# Patient Record
Sex: FEMALE | Race: ASIAN | NOT HISPANIC OR LATINO | Employment: UNEMPLOYED | URBAN - METROPOLITAN AREA
[De-identification: names, ages, dates, MRNs, and addresses within clinical notes are randomized per-mention and may not be internally consistent; named-entity substitution may affect disease eponyms.]

---
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
## 2022-07-24 ENCOUNTER — HEALTH MAINTENANCE LETTER (OUTPATIENT)
Age: 22
End: 2022-07-24

## 2022-10-02 ENCOUNTER — HEALTH MAINTENANCE LETTER (OUTPATIENT)
Age: 22
End: 2022-10-02

## 2023-04-05 ENCOUNTER — APPOINTMENT (OUTPATIENT)
Dept: INTERPRETER SERVICES | Facility: CLINIC | Age: 23
End: 2023-04-05
Payer: COMMERCIAL

## 2023-04-26 ENCOUNTER — OFFICE VISIT (OUTPATIENT)
Dept: MIDWIFE SERVICES | Facility: CLINIC | Age: 23
End: 2023-04-26
Payer: COMMERCIAL

## 2023-04-26 ENCOUNTER — HOSPITAL ENCOUNTER (OUTPATIENT)
Dept: ULTRASOUND IMAGING | Facility: HOSPITAL | Age: 23
Discharge: HOME | End: 2023-04-26
Attending: ADVANCED PRACTICE MIDWIFE | Admitting: ADVANCED PRACTICE MIDWIFE
Payer: COMMERCIAL

## 2023-04-26 VITALS
SYSTOLIC BLOOD PRESSURE: 122 MMHG | BODY MASS INDEX: 39.16 KG/M2 | WEIGHT: 221 LBS | OXYGEN SATURATION: 99 % | HEART RATE: 109 BPM | DIASTOLIC BLOOD PRESSURE: 76 MMHG | HEIGHT: 63 IN

## 2023-04-26 DIAGNOSIS — N93.9 ABNORMAL UTERINE BLEEDING (AUB): ICD-10-CM

## 2023-04-26 DIAGNOSIS — R73.03 PREDIABETES: ICD-10-CM

## 2023-04-26 DIAGNOSIS — Z11.3 SCREEN FOR STD (SEXUALLY TRANSMITTED DISEASE): ICD-10-CM

## 2023-04-26 DIAGNOSIS — N89.8 VAGINAL ODOR: ICD-10-CM

## 2023-04-26 DIAGNOSIS — N93.9 ABNORMAL UTERINE BLEEDING (AUB): Primary | ICD-10-CM

## 2023-04-26 LAB
ALBUMIN SERPL BCG-MCNC: 4.5 G/DL (ref 3.5–5.2)
ALP SERPL-CCNC: 88 U/L (ref 35–104)
ALT SERPL W P-5'-P-CCNC: 17 U/L (ref 10–35)
ANION GAP SERPL CALCULATED.3IONS-SCNC: 12 MMOL/L (ref 7–15)
AST SERPL W P-5'-P-CCNC: 34 U/L (ref 10–35)
BILIRUB SERPL-MCNC: 0.3 MG/DL
BUN SERPL-MCNC: 12.6 MG/DL (ref 6–20)
CALCIUM SERPL-MCNC: 9.9 MG/DL (ref 8.6–10)
CHLORIDE SERPL-SCNC: 106 MMOL/L (ref 98–107)
CLUE CELLS: NORMAL
CREAT SERPL-MCNC: 0.83 MG/DL (ref 0.51–0.95)
DEPRECATED HCO3 PLAS-SCNC: 25 MMOL/L (ref 22–29)
ERYTHROCYTE [DISTWIDTH] IN BLOOD BY AUTOMATED COUNT: 18 % (ref 10–15)
GFR SERPL CREATININE-BSD FRML MDRD: >90 ML/MIN/1.73M2
GLUCOSE SERPL-MCNC: 106 MG/DL (ref 70–99)
HBA1C MFR BLD: 5.3 % (ref 0–5.6)
HCT VFR BLD AUTO: 31.9 % (ref 35–47)
HGB BLD-MCNC: 9.8 G/DL (ref 11.7–15.7)
MCH RBC QN AUTO: 22.8 PG (ref 26.5–33)
MCHC RBC AUTO-ENTMCNC: 30.7 G/DL (ref 31.5–36.5)
MCV RBC AUTO: 74 FL (ref 78–100)
PLATELET # BLD AUTO: 419 10E3/UL (ref 150–450)
POTASSIUM SERPL-SCNC: 4 MMOL/L (ref 3.4–5.3)
PROT SERPL-MCNC: 7.9 G/DL (ref 6.4–8.3)
RBC # BLD AUTO: 4.29 10E6/UL (ref 3.8–5.2)
SODIUM SERPL-SCNC: 143 MMOL/L (ref 136–145)
TRICHOMONAS, WET PREP: NORMAL
TSH SERPL DL<=0.005 MIU/L-ACNC: 1.71 UIU/ML (ref 0.3–4.2)
WBC # BLD AUTO: 10.5 10E3/UL (ref 4–11)
WBC'S/HIGH POWER FIELD, WET PREP: NORMAL
YEAST, WET PREP: NORMAL

## 2023-04-26 PROCEDURE — 87210 SMEAR WET MOUNT SALINE/INK: CPT | Performed by: ADVANCED PRACTICE MIDWIFE

## 2023-04-26 PROCEDURE — 36415 COLL VENOUS BLD VENIPUNCTURE: CPT

## 2023-04-26 PROCEDURE — 82670 ASSAY OF TOTAL ESTRADIOL: CPT

## 2023-04-26 PROCEDURE — 83021 HEMOGLOBIN CHROMOTOGRAPHY: CPT

## 2023-04-26 PROCEDURE — 80053 COMPREHEN METABOLIC PANEL: CPT

## 2023-04-26 PROCEDURE — 83001 ASSAY OF GONADOTROPIN (FSH): CPT

## 2023-04-26 PROCEDURE — 84146 ASSAY OF PROLACTIN: CPT

## 2023-04-26 PROCEDURE — 83002 ASSAY OF GONADOTROPIN (LH): CPT

## 2023-04-26 PROCEDURE — 87591 N.GONORRHOEAE DNA AMP PROB: CPT | Performed by: ADVANCED PRACTICE MIDWIFE

## 2023-04-26 PROCEDURE — 85027 COMPLETE CBC AUTOMATED: CPT

## 2023-04-26 PROCEDURE — 87491 CHLMYD TRACH DNA AMP PROBE: CPT | Performed by: ADVANCED PRACTICE MIDWIFE

## 2023-04-26 PROCEDURE — 84403 ASSAY OF TOTAL TESTOSTERONE: CPT

## 2023-04-26 PROCEDURE — 84443 ASSAY THYROID STIM HORMONE: CPT

## 2023-04-26 PROCEDURE — 99214 OFFICE O/P EST MOD 30 MIN: CPT | Performed by: ADVANCED PRACTICE MIDWIFE

## 2023-04-26 PROCEDURE — 83036 HEMOGLOBIN GLYCOSYLATED A1C: CPT

## 2023-04-26 PROCEDURE — 84270 ASSAY OF SEX HORMONE GLOBUL: CPT

## 2023-04-26 PROCEDURE — 84702 CHORIONIC GONADOTROPIN TEST: CPT

## 2023-04-26 PROCEDURE — 76856 US EXAM PELVIC COMPLETE: CPT

## 2023-04-26 NOTE — PROGRESS NOTES
"Assessment:   1.  Abnormal uterine bleeding likely due to unopposed estrogen secondary to obesity  2.  BMI 39  3.  Prediabetes  4.  Vaginal odor  5.  STI screening     Plan:      1. Discussed nutrition and exercise.  Advised MVI, Vitamin D3 4000IU geltab and an omega 3 supplement daily   2. Blood tests: CBC, CMP, TSH with reflex free T4, hemoglobin A1c, FSH, LH, estradiol, testosterone (free and total), prolactin, quantitative beta-hCG, wet prep and GC/CT.  3. Plan to communicate results ASAP due to patient's departure to [place] tomorrow.  4. Contraception: After NEGATIVE pregnancy test results, may begin combined oral contraceptive pills recommended.  Ortho-Novum: 1 p.o. daily, #84, 3 refills. ACHES reviewed.  5.  Next pap due at 23 years of age.  6.  Pelvic ultrasound today.  7.  RTC in 3 months for BP check if initiate combined oral contraceptive pills, PRN    Subjective:   Mamta Morris is a 22 year old female who presents for a problem visit: Abnormal uterine bleeding.  PHQ-9: 5, \"not difficult at all\".  Vaginal odor between vaginal bleeding episodes, \"it smells like pork that was left out too long.\"  Sexually active with 1 male partner for over 2 years, boyfriend Darian who lives in [place].  Patient states that she is moving to [place], [place] tomorrow in order to go to school and live with her boyfriend.  She quit her job here in [place], her last day was today.  She last saw her boyfriend between March 13 and March 17 during which time they had unprotected intercourse.  She does not suspect that she is pregnant.  Previously, she was supposed to utilizing birth control to regulate her periods, but now open.  Eventually, desires conception.  Her boyfriend is not ready for that.  Seen on 11/10/2021 for the same chief complaint.  Serum labs were performed (please see results and progress note).  BMI at that time was 38.  Prediabetes was diagnosed.  Family history of diabetes type 2 in her father which " resolved after a 100 pound weight loss.      Immunization History   Administered Date(s) Administered     COVID-19 Vaccine 12+ (Pfizer ) 2022, 2022     DTAP (<7y) 2004, 2005, 2005, 2005     HepA-Peds, Unspecified 10/04/2004     HepB, Unspecified 2004, 2005, 2005     Hib, Unspecified 2005, 2005     Historic Hib Hib-titer 2007     Influenza (IIV3) PF 2005     Influenza Vaccine >6 months (Alfuria,Fluzone) 11/15/2021     MMR 2004     Pneumococcal (PCV 7) 2005, 2007     Polio, Unspecified  2004, 2005, 2005     Varicella 2004       Gynecologic History  No LMP recorded. (Menstrual status: Irregular Periods).  Contraception: None     Cancer screening:   Last Pap: Never        OB History    Para Term  AB Living   0 0 0 0 0 0   SAB IAB Ectopic Multiple Live Births   0 0 0 0 0       Current Outpatient Medications   Medication Sig Dispense Refill     cetirizine (ZYRTEC) 10 MG tablet Take 10 mg by mouth daily       ibuprofen (ADVIL,MOTRIN) 600 MG tablet [IBUPROFEN (ADVIL,MOTRIN) 600 MG TABLET] Take 1 tablet (600 mg total) by mouth every 6 (six) hours as needed for pain. 30 tablet 0     norethindrone-ethinyl estradiol (ORTHO-NOVUM) 1-35 MG-MCG tablet Take 1 tablet by mouth daily 84 tablet 3     Past Medical History:   Diagnosis Date     Asthma      Pneumonia      Respiratory failure (H)      History reviewed. No pertinent surgical history.  Fruit [fruit extracts]  Family History   Problem Relation Age of Onset     No Known Problems Mother      Diabetes Type 2  Father      No Known Problems Maternal Grandmother      No Known Problems Maternal Grandfather      No Known Problems Paternal Grandmother      No Known Problems Paternal Grandfather      Social History     Socioeconomic History     Marital status: Single     Spouse name: Not on file     Number of children: Not on file     Years of  "education: Not on file     Highest education level: Not on file   Occupational History     Not on file   Tobacco Use     Smoking status: Never     Passive exposure: Never     Smokeless tobacco: Never   Vaping Use     Vaping status: Not on file   Substance and Sexual Activity     Alcohol use: Never     Drug use: Never     Sexual activity: Never   Other Topics Concern     Not on file   Social History Narrative     Not on file     Social Determinants of Health     Financial Resource Strain: Not on file   Food Insecurity: Not on file   Transportation Needs: Not on file   Physical Activity: Not on file   Stress: Not on file   Social Connections: Not on file   Intimate Partner Violence: Not on file   Housing Stability: Not on file       Review of Systems  General:  Denies problem  Gastrointestinal:  denies problems  Genitourinary: vaginal bleeding  Neurologic:denies problems  Psychiatric: denies problems      Objective:      Vitals:    04/26/23 1542   BP: 122/76   Pulse: 109   SpO2: 99%   Weight: 100.2 kg (221 lb)   Height: 1.588 m (5' 2.5\")       Physical Exam:  General Appearance: Alert, cooperative, no distress, appears stated age    "

## 2023-04-26 NOTE — PROGRESS NOTES
"    PROBLEM VISIT: ABNORMAL UTERINE BLEEDING      Subjective:     [unfilled]Mamta Morris is a 22 year old female who presents for evaluation of {amen/oligo:75070}. Currently periods are occurring every {1-10:85798} {time; units:08761::\"days\"}. Bleeding is {vaginal bleedin}. Periods {were/were not:95339} regular in the past occurring every {1-10:60233} {time; units:11376::\"days\"}. Patient has {relevant pmh:56569}. Is there a chance of pregnancy? {yes/no:63}. HCG lab test done? {yes/no:63}, {pos/neg/not done:285199}. Factors that may be contributory to menstrual abnormalities include: {amenorrhea contributing factors:89786}. Previous treatments for menstrual abnormalities include: {amenorrhea treatments:14528}.    The following portions of the patient's history were reviewed and updated as appropriate: {OGB History:47261715::\"allergies\",\"current medications\",\"problem list\"}    Review of Systems  General:  Denies problem  Eyes: Denies problem  Ears/Nose/Throat: Denies problem  Cardiovascular: Denies problem  Respiratory:  Denies problem  Gastrointestinal:  Denies problem, Genitourinary: Denies problem  Musculoskeletal:  Denies problem  Skin: Denies problem  Neurologic: Denies problem  Psychiatric: Denies problem  Endocrine: Denies problem  Heme/Lymphatic: Denies problem   Allergic/Immunologic: Denies problem                 Objective:     [unfilled]There were no vitals filed for this visit.    Physical Exam:  General Appearance: Alert, cooperative, no distress, appears stated age  HEENT: grossly normal; otoscopic and opthalmic exam not performed.   Neck: Supple, symmetrical, trachea midline, no adenopathy;  thyroid: not enlarged, symmetric, no tenderness/mass/nodules  Back: Symmetric, no curvature, ROM normal, no CVA tenderness  Lungs: Clear to auscultation bilaterally, respirations unlabored  Heart: Regular rate and rhythm, S1 and S2 normal, no murmur, rub, or gallop  Breasts: No breast masses, " "tenderness, asymmetry, or nipple discharge.  Abdomen: Soft, non-tender, bowel sounds normoactive all four quadrants,  no masses, no organomegaly  Pelvic: {Blank single:16186::\"Not indicated\",\"External genitalia normal without lesions or irritation. Vagina and cervix with *** discharge show no lesions, inflammation, or tenderness. No cystocele, No rectocele. Uterus & adnexal normal without masses or tenderness\"} { Pelvic exam:27428}  Extremities: Extremities normal, atraumatic, no cyanosis or edema  Skin: Skin color, texture, turgor normal, no rashes or lesions  Neurologic: Normal       Lab Review  No results found for any visits on 04/26/23.       Assessment:     [unfilled]The patient has No diagnosis found..      Plan:     [unfilled]{gyn plan:14656}     Time spent:  Chart review/Pre-charting: *** min day of service  Face-to-face visit: *** min counseling and education  Documentation: *** min  Total time spent on day of service: *** min    THU Bhandari CNM   4/26/2023    "

## 2023-04-27 ENCOUNTER — TELEPHONE (OUTPATIENT)
Dept: MIDWIFE SERVICES | Facility: CLINIC | Age: 23
End: 2023-04-27
Payer: COMMERCIAL

## 2023-04-27 LAB
C TRACH DNA SPEC QL PROBE+SIG AMP: NEGATIVE
ESTRADIOL SERPL-MCNC: 24 PG/ML
FSH SERPL IRP2-ACNC: 5.6 MIU/ML
HCG INTACT+B SERPL-ACNC: 162 MIU/ML
LH SERPL-ACNC: 9.6 MIU/ML
N GONORRHOEA DNA SPEC QL NAA+PROBE: NEGATIVE
PROLACTIN SERPL 3RD IS-MCNC: 10 NG/ML (ref 5–23)
SHBG SERPL-SCNC: 9 NMOL/L (ref 30–135)

## 2023-04-27 ASSESSMENT — PATIENT HEALTH QUESTIONNAIRE - PHQ9: SUM OF ALL RESPONSES TO PHQ QUESTIONS 1-9: 5

## 2023-04-27 NOTE — TELEPHONE ENCOUNTER
"A: 1.  Elevated quantitative beta-hCG  2.  Vaginal bleeding    P: Considering patient has moved out of [facility] and does not have medical insurance at this time in the [facility], this writer recommends following up tomorrow, 4/28/2023 for repeat quantitative beta-hCG at a community clinic, sliding scale, consider [facility].  Quantitative beta-hCG measurement will be essential to determine ongoing plan of care.  Patient has verbal understanding of and agrees with plan of care.  Reiterated to hold initiation of combined oral contraceptive pills.    S: \"I just landed.\"  Patient is moving to [facility] today.    O: Alert.  Distress  4/26/2023: Quantitative beta-hCG ELEVATED 162  "

## 2023-04-28 ENCOUNTER — TELEPHONE (OUTPATIENT)
Dept: MIDWIFE SERVICES | Facility: CLINIC | Age: 23
End: 2023-04-28
Payer: COMMERCIAL

## 2023-04-28 NOTE — TELEPHONE ENCOUNTER
Telephone call to patient ,states she called [facility] and they do not offer Beta Hcg levels and were unable to help patient with other options. Since patient is in [place], recommend patient reach out to an OB/GYN office and ask where a patient with no insurance can go for blood work with minimal out of pocket cost. Patient states she will ask around and also try and call OB office for guidance. All questions answered.

## 2023-04-28 NOTE — TELEPHONE ENCOUNTER
Pt had left a previous message and ABB called her and answered all questions however pt has more questions now since [facility] and [facility] not offer the test for hcg levels. Pt does not know what to do next.

## 2023-04-28 NOTE — TELEPHONE ENCOUNTER
Pt called and is wondering if RYAN can call her back? She spoke to RYAN yesterday but she forgot what she said.

## 2023-04-28 NOTE — TELEPHONE ENCOUNTER
Telephone call to patient, discussed recommendation for follow up beta hcg level today at [facility] as patient moved to [place] yesterday and does not yet have insurance. All questions answered.

## 2023-05-01 ENCOUNTER — TELEPHONE (OUTPATIENT)
Dept: MIDWIFE SERVICES | Facility: CLINIC | Age: 23
End: 2023-05-01
Payer: COMMERCIAL

## 2023-05-01 LAB
TESTOST FREE SERPL-MCNC: 0.62 NG/DL
TESTOST SERPL-MCNC: 20 NG/DL (ref 8–60)

## 2023-05-01 NOTE — TELEPHONE ENCOUNTER
Mamta is a 22 yr old female with dysfunctional uterine bleeding who was seen by CNM last week at [facility]. She has since moved to [place] and has not yet established care out there. States her bleeding is more like pinkish discharge now, does not need to wear a pad. Has not had any follow up lab work, has not found a clinic yet. Will continue to watch for changes in bleeding and if does not get her period back as expected next month will seek care in a clinic that can check hormone levels for her. Advised to wait until next regular period to start OCP use and to use condoms until then with every episode of intercourse.

## 2023-05-01 NOTE — TELEPHONE ENCOUNTER
Pt was told to get her hormones checked 48 hours after her first test. It has been more than 48 hours and she's wondering if she should still go and get that done. Please call her back to discuss

## 2023-05-03 ENCOUNTER — TELEPHONE (OUTPATIENT)
Dept: MIDWIFE SERVICES | Facility: CLINIC | Age: 23
End: 2023-05-03
Payer: COMMERCIAL

## 2023-05-03 LAB
HEMOGLOBIN A2 QUANTITATION: 2.1 % (ref 2.2–3.5)
HEMOGLOBIN A: 97.9 % (ref 94.5–97.8)
HEMOGLOBIN ELECTROPHRESIS: ABNORMAL
HEMOGLOBIN F QUANTITATION: <0.5 % (ref 0–2)

## 2023-05-04 ENCOUNTER — TELEPHONE (OUTPATIENT)
Dept: MIDWIFE SERVICES | Facility: CLINIC | Age: 23
End: 2023-05-04
Payer: COMMERCIAL

## 2023-05-04 DIAGNOSIS — N93.8 DYSFUNCTIONAL UTERINE BLEEDING: Primary | ICD-10-CM

## 2023-05-04 NOTE — TELEPHONE ENCOUNTER
Fax# [phone]   [facility]    Pt spoke to LOIS yesterday and is calling back with a request for a referral to be sent to this clinic for a blood draw.(hcg levels)

## 2023-05-04 NOTE — TELEPHONE ENCOUNTER
Outgoing call to patient. Informed patient that requested lab order was faxed to [facility] at [phone] as requested. Patient verbalizes understanding and offers no further questions or concerns.

## 2023-05-05 PROCEDURE — 99000 SPECIMEN HANDLING OFFICE-LAB: CPT | Performed by: ADVANCED PRACTICE MIDWIFE

## 2023-05-05 PROCEDURE — 84702 CHORIONIC GONADOTROPIN TEST: CPT | Mod: 90 | Performed by: ADVANCED PRACTICE MIDWIFE

## 2023-05-08 ENCOUNTER — TELEPHONE (OUTPATIENT)
Dept: MIDWIFE SERVICES | Facility: CLINIC | Age: 23
End: 2023-05-08
Payer: COMMERCIAL

## 2023-05-08 LAB
Lab: 4416
PERFORMING LABORATORY: NORMAL
SPECIMEN STATUS: NORMAL
TEST NAME: NORMAL

## 2023-05-08 NOTE — TELEPHONE ENCOUNTER
Phone call received from [facility].  They received HCG results from [facility].  Caller states that [facility] will be billing [facility] for the charges.  Bill should go to patient.  Will attempt to call [facility] to change billing.

## 2023-05-08 NOTE — TELEPHONE ENCOUNTER
Pt called stating she missed a call earlier. She was told the CNM calling with the results was currently seeing patients but would have her call her back at end of clinic day.

## 2023-05-08 NOTE — TELEPHONE ENCOUNTER
Called patient back and reviewed lab work that was faxed to clinic.  Beta Hcg quant is <1.  Patient informed of results.

## 2023-06-07 ENCOUNTER — TELEPHONE (OUTPATIENT)
Dept: MIDWIFE SERVICES | Facility: CLINIC | Age: 23
End: 2023-06-07
Payer: COMMERCIAL

## 2023-06-07 NOTE — TELEPHONE ENCOUNTER
Pt just started taking birth control pills 3 weeks ago and she states she too weeks 1 and 2 and then took the 3rd week to stop bleeding and she is still bleeding. She wants to know if she should keep taking it? Would prefer to speak to RYAN but ok with other

## 2023-06-12 NOTE — TELEPHONE ENCOUNTER
Reached patient by phone.  She had a negative pregnancy test (per patient) on 5/5/2023, and she initiated combined oral contraceptive pills.  She states that she has spotting throughout the first pack with her period in 4th week.  This writer recommends continuing oral contraceptive pills as prescribed, and follow-up in the clinic close to her in [place], [place].  I discouraged continuous oral contraceptive pill taking if spotting--better to have a menstrual period.  She has verbal understanding of and agrees with plan of care.

## 2023-08-12 ENCOUNTER — HEALTH MAINTENANCE LETTER (OUTPATIENT)
Age: 23
End: 2023-08-12

## 2024-10-05 ENCOUNTER — HEALTH MAINTENANCE LETTER (OUTPATIENT)
Age: 24
End: 2024-10-05